# Patient Record
Sex: FEMALE | Race: BLACK OR AFRICAN AMERICAN | NOT HISPANIC OR LATINO | URBAN - METROPOLITAN AREA
[De-identification: names, ages, dates, MRNs, and addresses within clinical notes are randomized per-mention and may not be internally consistent; named-entity substitution may affect disease eponyms.]

---

## 2017-07-03 ENCOUNTER — NEW REFERRAL (OUTPATIENT)
Dept: URBAN - METROPOLITAN AREA CLINIC 105 | Facility: CLINIC | Age: 47
End: 2017-07-03

## 2017-07-03 DIAGNOSIS — M32.10: ICD-10-CM

## 2017-07-03 DIAGNOSIS — H31.103: ICD-10-CM

## 2017-07-03 DIAGNOSIS — H35.463: ICD-10-CM

## 2017-07-03 DIAGNOSIS — H52.13: ICD-10-CM

## 2017-07-03 PROCEDURE — 92134 CPTRZ OPH DX IMG PST SGM RTA: CPT

## 2017-07-03 PROCEDURE — 1036F TOBACCO NON-USER: CPT

## 2017-07-03 PROCEDURE — 99244 OFF/OP CNSLTJ NEW/EST MOD 40: CPT

## 2017-07-03 PROCEDURE — 92225 OPHTHALMOSCOPY (INITIAL): CPT

## 2017-07-03 PROCEDURE — 4040F PNEUMOC VAC/ADMIN/RCVD: CPT | Mod: 8P

## 2017-07-03 ASSESSMENT — VISUAL ACUITY
OD_CC: 20/25
OS_CC: 20/20
OS_CC: 20/20-
OD_CC: 20/25

## 2017-07-03 ASSESSMENT — TONOMETRY
OD_IOP_MMHG: 22
OS_IOP_MMHG: 17

## 2018-09-24 ENCOUNTER — FOLLOW UP (OUTPATIENT)
Dept: URBAN - METROPOLITAN AREA CLINIC 105 | Facility: CLINIC | Age: 48
End: 2018-09-24

## 2018-09-24 DIAGNOSIS — H31.103: ICD-10-CM

## 2018-09-24 DIAGNOSIS — M32.10: ICD-10-CM

## 2018-09-24 DIAGNOSIS — H35.463: ICD-10-CM

## 2018-09-24 PROCEDURE — 1036F TOBACCO NON-USER: CPT

## 2018-09-24 PROCEDURE — 92014 COMPRE OPH EXAM EST PT 1/>: CPT

## 2018-09-24 PROCEDURE — 4040F PNEUMOC VAC/ADMIN/RCVD: CPT | Mod: 8P

## 2018-09-24 PROCEDURE — 92134 CPTRZ OPH DX IMG PST SGM RTA: CPT

## 2018-09-24 PROCEDURE — 92226 OPHTHALMOSCOPY (SUB): CPT

## 2018-09-24 PROCEDURE — G9903 PT SCRN TBCO ID AS NON USER: HCPCS

## 2018-09-24 ASSESSMENT — VISUAL ACUITY
OD_CC: 20/20
OS_CC: 20/30
OS_SC: 20/20
OD_SC: 20/25+1

## 2018-09-24 ASSESSMENT — TONOMETRY
OS_IOP_MMHG: 18
OD_IOP_MMHG: 25

## 2019-11-14 ENCOUNTER — FOLLOW UP (OUTPATIENT)
Dept: URBAN - METROPOLITAN AREA CLINIC 105 | Facility: CLINIC | Age: 49
End: 2019-11-14

## 2019-11-14 DIAGNOSIS — H35.461: ICD-10-CM

## 2019-11-14 DIAGNOSIS — M32.10: ICD-10-CM

## 2019-11-14 DIAGNOSIS — H31.103: ICD-10-CM

## 2019-11-14 DIAGNOSIS — H25.13: ICD-10-CM

## 2019-11-14 PROCEDURE — 92134 CPTRZ OPH DX IMG PST SGM RTA: CPT

## 2019-11-14 PROCEDURE — 92226 OPHTHALMOSCOPY (SUB): CPT

## 2019-11-14 PROCEDURE — 92014 COMPRE OPH EXAM EST PT 1/>: CPT

## 2019-11-14 ASSESSMENT — VISUAL ACUITY
OD_CC: 20/30+2
OS_CC: 20/20-1
OS_CC: 20/20
OD_CC: 20/20-1

## 2019-11-14 ASSESSMENT — TONOMETRY
OS_IOP_MMHG: 17
OD_IOP_MMHG: 17

## 2020-07-02 ENCOUNTER — FOLLOW UP (OUTPATIENT)
Dept: URBAN - METROPOLITAN AREA CLINIC 105 | Facility: CLINIC | Age: 50
End: 2020-07-02

## 2020-07-02 DIAGNOSIS — M32.10: ICD-10-CM

## 2020-07-02 DIAGNOSIS — H35.461: ICD-10-CM

## 2020-07-02 DIAGNOSIS — H25.13: ICD-10-CM

## 2020-07-02 DIAGNOSIS — Z79.899: ICD-10-CM

## 2020-07-02 DIAGNOSIS — H31.103: ICD-10-CM

## 2020-07-02 PROCEDURE — 92134 CPTRZ OPH DX IMG PST SGM RTA: CPT

## 2020-07-02 PROCEDURE — 92014 COMPRE OPH EXAM EST PT 1/>: CPT

## 2020-07-02 PROCEDURE — 92202 OPSCPY EXTND ON/MAC DRAW: CPT

## 2020-07-02 ASSESSMENT — TONOMETRY
OD_IOP_MMHG: 20
OS_IOP_MMHG: 18

## 2020-07-02 ASSESSMENT — VISUAL ACUITY
OS_CC: 20/20-
OD_CC: 20/50
OD_CC: 20/20
OS_CC: 20/25

## 2020-12-28 ENCOUNTER — FOLLOW UP (OUTPATIENT)
Dept: URBAN - METROPOLITAN AREA CLINIC 103 | Facility: CLINIC | Age: 50
End: 2020-12-28

## 2020-12-28 VITALS — HEIGHT: 55 IN

## 2020-12-28 DIAGNOSIS — H31.103: ICD-10-CM

## 2020-12-28 DIAGNOSIS — Z79.899: ICD-10-CM

## 2020-12-28 DIAGNOSIS — H25.13: ICD-10-CM

## 2020-12-28 DIAGNOSIS — M32.10: ICD-10-CM

## 2020-12-28 DIAGNOSIS — H35.461: ICD-10-CM

## 2020-12-28 PROCEDURE — 92202 OPSCPY EXTND ON/MAC DRAW: CPT

## 2020-12-28 PROCEDURE — 92134 CPTRZ OPH DX IMG PST SGM RTA: CPT

## 2020-12-28 PROCEDURE — 92014 COMPRE OPH EXAM EST PT 1/>: CPT

## 2020-12-28 ASSESSMENT — VISUAL ACUITY
OD_CC: 20/25
OS_CC: 20/20-1
OS_CC: 20/40
OD_CC: 20/40

## 2020-12-28 ASSESSMENT — TONOMETRY
OS_IOP_MMHG: 19
OD_IOP_MMHG: 18

## 2022-08-23 ENCOUNTER — OFFICE VISIT (OUTPATIENT)
Dept: URGENT CARE | Facility: CLINIC | Age: 52
End: 2022-08-23
Payer: COMMERCIAL

## 2022-08-23 VITALS
BODY MASS INDEX: 30.36 KG/M2 | WEIGHT: 165 LBS | RESPIRATION RATE: 18 BRPM | HEART RATE: 53 BPM | DIASTOLIC BLOOD PRESSURE: 80 MMHG | HEIGHT: 62 IN | OXYGEN SATURATION: 99 % | TEMPERATURE: 97.8 F | SYSTOLIC BLOOD PRESSURE: 116 MMHG

## 2022-08-23 DIAGNOSIS — J06.9 ACUTE URI: Primary | ICD-10-CM

## 2022-08-23 PROBLEM — E21.3 HYPERPARATHYROIDISM (HCC): Status: ACTIVE | Noted: 2019-03-04

## 2022-08-23 PROBLEM — M41.129 ADOLESCENT IDIOPATHIC SCOLIOSIS: Status: ACTIVE | Noted: 2020-01-30

## 2022-08-23 PROBLEM — L63.9 ALOPECIA AREATA: Status: ACTIVE | Noted: 2017-10-10

## 2022-08-23 PROBLEM — K74.3 PRIMARY BILIARY CIRRHOSIS (HCC): Status: ACTIVE | Noted: 2020-01-10

## 2022-08-23 PROBLEM — E83.52 HYPERCALCEMIA: Status: ACTIVE | Noted: 2018-11-07

## 2022-08-23 PROBLEM — I10 HTN (HYPERTENSION): Status: ACTIVE | Noted: 2020-02-03

## 2022-08-23 PROBLEM — M32.9 SYSTEMIC LUPUS ERYTHEMATOSUS (HCC): Status: ACTIVE | Noted: 2017-10-10

## 2022-08-23 PROBLEM — R74.01 TRANSAMINITIS: Status: ACTIVE | Noted: 2021-07-29

## 2022-08-23 PROBLEM — K21.00 GASTROESOPHAGEAL REFLUX DISEASE WITH ESOPHAGITIS: Status: ACTIVE | Noted: 2020-02-03

## 2022-08-23 PROBLEM — L65.9 ALOPECIA: Status: ACTIVE | Noted: 2017-10-10

## 2022-08-23 PROBLEM — M54.16 LUMBAR RADICULOPATHY: Status: ACTIVE | Noted: 2022-03-16

## 2022-08-23 PROBLEM — E66.9 OBESITY WITH BODY MASS INDEX 30 OR GREATER: Status: ACTIVE | Noted: 2018-09-20

## 2022-08-23 LAB
SARS-COV-2 AG UPPER RESP QL IA: NEGATIVE
VALID CONTROL: NORMAL

## 2022-08-23 PROCEDURE — 99203 OFFICE O/P NEW LOW 30 MIN: CPT | Performed by: FAMILY MEDICINE

## 2022-08-23 PROCEDURE — 87811 SARS-COV-2 COVID19 W/OPTIC: CPT | Performed by: FAMILY MEDICINE

## 2022-08-23 RX ORDER — VALACYCLOVIR HYDROCHLORIDE 500 MG/1
TABLET, FILM COATED ORAL
COMMUNITY
Start: 2022-08-16

## 2022-08-23 RX ORDER — CHLOROQUINE PHOSPHATE 250 MG/1
TABLET ORAL
COMMUNITY
Start: 2022-08-08

## 2022-08-23 RX ORDER — RISEDRONATE SODIUM 150 MG/1
TABLET, FILM COATED ORAL
COMMUNITY
Start: 2022-06-13

## 2022-08-23 RX ORDER — URSODIOL 300 MG/1
300 CAPSULE ORAL 3 TIMES DAILY
COMMUNITY
Start: 2022-07-29

## 2022-08-23 RX ORDER — NEBIVOLOL 5 MG/1
5 TABLET ORAL DAILY
COMMUNITY
Start: 2022-07-18

## 2022-08-23 RX ORDER — AMLODIPINE BESYLATE 10 MG/1
10 TABLET ORAL DAILY
COMMUNITY
Start: 2022-07-01

## 2022-08-23 RX ORDER — FLUOCINOLONE ACETONIDE 0.11 MG/ML
OIL TOPICAL
COMMUNITY
Start: 2022-06-29

## 2022-08-23 RX ORDER — FENOFIBRATE 145 MG/1
145 TABLET, COATED ORAL DAILY
COMMUNITY
Start: 2022-08-01

## 2022-08-23 NOTE — PROGRESS NOTES
[facility]        NAME: Corona Zapien is a 45 y o  female  : 1970    MRN: [id number]  DATE: 2022  TIME: 12:39 PM    Assessment and Plan   Acute URI [J06 9]  1  Acute URI  Poct Covid 19 Rapid Antigen Test         Patient Instructions     Patient Instructions   Acute viral URI   - rapid COVID-19 test performed in office is negative   - rest and drink plenty of fluids  - take Tylenol as needed   - advised warm salt water gargles and throat lozenges as needed   - drink warm tea w/ lemon and honey   - run a humidifier at home   - advised using Flonase nasal spray   - if symptoms persist despite treatment, worsen, or any new symptoms present, should be seen by PCP for re-check  Follow up with PCP in 3-5 days  Proceed to  ER if symptoms worsen  Chief Complaint     Chief Complaint   Patient presents with    Cold Like Symptoms         History of Present Illness       46 yo female presents c/o headache, mild nasal congestion, scratchy throat, mild body aches, and chest congestion  She has been ill x 2 days  No fever/chills  No chest pain, SOB, or wheezing  Non-smoker  No GI sx  No skin rashes  No loss of taste or smell  No recent travel or known exposure to anyone with COVID-19  Patient has received the COVID-19 vaccine  She would like to be tested for COVID-19 today  She has not attempted any treatment for the symptoms  Rapid COVID-19 test performed in office is negative  Review of Systems   Review of Systems   Constitutional: Negative  HENT:        As noted in HPI   Eyes: Negative  Respiratory: Negative  Cardiovascular: Negative  Gastrointestinal: Negative  Musculoskeletal:        As noted in HPI   Skin: Negative  Allergic/Immunologic:        As noted in the chart   Neurological: Negative  Hematological: Negative            Current Medications       Current Outpatient Medications:     amLODIPine (NORVASC) 10 mg tablet, Take 10 mg by mouth daily, Disp: , Rfl:     Biotin w/ Vitamins C & E 1250-7 5-7 5 MCG-MG-UNT CHEW, Chew 3 tablets daily, Disp: , Rfl:     CALCIUM PO, Take 1,000 mg by mouth daily, Disp: , Rfl:     chloroquine (ARALEN) 250 MG tablet, TAKE 1 TABLET BY MOUTH 4 DAYS A WEEK, Disp: , Rfl:     Cholecalciferol 25 MCG (1000 UT) tablet, Take 2,000 Units by mouth daily, Disp: , Rfl:     esomeprazole (NexIUM) 20 mg capsule, Take 20 mg by mouth daily, Disp: , Rfl:     fenofibrate (TRICOR) 145 mg tablet, Take 145 mg by mouth daily, Disp: , Rfl:     Fluocinolone Acetonide Scalp 0 01 % OIL, APPLY TO SCALP AND HAIR AT NIGHT AND WASH OUT THE FOLLOWING MORNING , Disp: , Rfl:     LACTOBACILLUS PO, Take 1 tablet by mouth daily, Disp: , Rfl:     nebivolol (BYSTOLIC) 5 mg tablet, Take 5 mg by mouth daily, Disp: , Rfl:     risedronate (ACTONEL) 150 MG tablet, TAKE 1 TABLET BY MOUTH ONCE MONTHLY FOR 90 DAYS, Disp: , Rfl:     ursodiol (ACTIGALL) 300 mg capsule, Take 300 mg by mouth 3 (three) times a day, Disp: , Rfl:     valACYclovir (VALTREX) 500 mg tablet, 2 TABLETS ORALLY TWICE A DAY, FOR RECURRENT INFECTION TAKE 1 TAB EVERY 12 HOURS FOR 3 DAYS 7 DAYS, Disp: , Rfl:     Current Allergies     Allergies as of 08/23/2022 - Reviewed 08/23/2022   Allergen Reaction Noted    Enalapril Swelling and Angioedema 02/27/2018    Hydroxychloroquine Edema 01/25/2019            The following portions of the patient's history were reviewed and updated as appropriate: allergies, current medications, past family history, past medical history, past social history, past surgical history and problem list      Past Medical History:   Diagnosis Date    Lupus ([address] )        Past Surgical History:   Procedure Laterality Date    LAPAROSCOPY      THYROID SURGERY      Nodule  x2       Family History   Problem Relation Age of Onset    Multiple sclerosis Mother          Medications have been verified          Objective   /80   Pulse (!) 53   Temp 97 8 °F (36 6 °C) (Tympanic)   Resp 18    5' 2" (1 575 m)   Wt 74 8 kg (165 lb)   SpO2 99%   BMI 30 18 kg/m²   No LMP recorded  Physical Exam     Physical Exam  Vitals and nursing note reviewed  Constitutional:       General: She is awake  She is not in acute distress  Appearance: Normal appearance  She is well-developed, well-groomed and normal weight  She is not ill-appearing, toxic-appearing or diaphoretic  HENT:      Head: Normocephalic and atraumatic  Right Ear: Tympanic membrane, ear canal and external ear normal       Left Ear: Tympanic membrane, ear canal and external ear normal       Nose: Nose normal       Mouth/Throat:      Lips: Pink  Mouth: Mucous membranes are moist       Pharynx: Oropharynx is clear  Uvula midline  Eyes:      Conjunctiva/sclera: Conjunctivae normal    Cardiovascular:      Rate and Rhythm: Regular rhythm  Bradycardia present  Pulses: Normal pulses  Heart sounds: Normal heart sounds  Pulmonary:      Effort: Pulmonary effort is normal  No tachypnea, accessory muscle usage or respiratory distress  Breath sounds: Normal breath sounds and air entry  Skin:     General: Skin is warm and dry  Capillary Refill: Capillary refill takes less than 2 seconds  Coloration: Skin is not pale  Neurological:      Mental Status: She is alert and oriented to person, place, and time  Mental status is at baseline  Psychiatric:         Mood and Affect: Mood normal          Behavior: Behavior normal  Behavior is cooperative  Thought Content:  Thought content normal          Judgment: Judgment normal

## 2022-08-23 NOTE — PATIENT INSTRUCTIONS
Acute viral URI   - rapid COVID-19 test performed in office is negative   - rest and drink plenty of fluids  - take Tylenol as needed   - advised warm salt water gargles and throat lozenges as needed   - drink warm tea w/ lemon and honey   - run a humidifier at home   - advised using Flonase nasal spray   - if symptoms persist despite treatment, worsen, or any new symptoms present, should be seen by PCP for re-check

## 2022-09-08 ENCOUNTER — FOLLOW UP (OUTPATIENT)
Dept: URBAN - METROPOLITAN AREA CLINIC 105 | Facility: CLINIC | Age: 52
End: 2022-09-08

## 2022-09-08 DIAGNOSIS — H25.13: ICD-10-CM

## 2022-09-08 DIAGNOSIS — H31.103: ICD-10-CM

## 2022-09-08 DIAGNOSIS — Z79.899: ICD-10-CM

## 2022-09-08 DIAGNOSIS — M32.10: ICD-10-CM

## 2022-09-08 DIAGNOSIS — H35.463: ICD-10-CM

## 2022-09-08 PROCEDURE — 92134 CPTRZ OPH DX IMG PST SGM RTA: CPT

## 2022-09-08 PROCEDURE — 92014 COMPRE OPH EXAM EST PT 1/>: CPT

## 2022-09-08 PROCEDURE — 92202 OPSCPY EXTND ON/MAC DRAW: CPT

## 2022-09-08 ASSESSMENT — VISUAL ACUITY
OS_CC: 20/25
OD_CC: 20/20
OD_CC: 20/25-1
OS_CC: 20/20

## 2022-09-08 ASSESSMENT — TONOMETRY
OD_IOP_MMHG: 19
OS_IOP_MMHG: 20

## 2023-09-21 ENCOUNTER — FOLLOW UP (OUTPATIENT)
Dept: URBAN - METROPOLITAN AREA CLINIC 105 | Facility: CLINIC | Age: 53
End: 2023-09-21

## 2023-09-21 DIAGNOSIS — Z79.899: ICD-10-CM

## 2023-09-21 DIAGNOSIS — M32.10: ICD-10-CM

## 2023-09-21 DIAGNOSIS — H31.103: ICD-10-CM

## 2023-09-21 DIAGNOSIS — H25.13: ICD-10-CM

## 2023-09-21 PROCEDURE — 92134 CPTRZ OPH DX IMG PST SGM RTA: CPT

## 2023-09-21 PROCEDURE — 92014 COMPRE OPH EXAM EST PT 1/>: CPT

## 2023-09-21 PROCEDURE — 92202 OPSCPY EXTND ON/MAC DRAW: CPT

## 2023-09-21 ASSESSMENT — TONOMETRY
OS_IOP_MMHG: 14
OD_IOP_MMHG: 19

## 2023-09-21 ASSESSMENT — VISUAL ACUITY
OD_CC: 20/20
OS_CC: 20/20